# Patient Record
Sex: MALE | Race: WHITE | Employment: OTHER | URBAN - METROPOLITAN AREA
[De-identification: names, ages, dates, MRNs, and addresses within clinical notes are randomized per-mention and may not be internally consistent; named-entity substitution may affect disease eponyms.]

---

## 2022-07-12 ENCOUNTER — OFFICE VISIT (OUTPATIENT)
Dept: PHYSICAL MEDICINE AND REHAB | Age: 72
End: 2022-07-12

## 2022-07-12 VITALS — TEMPERATURE: 97.5 F

## 2022-07-12 DIAGNOSIS — R20.2 PARESTHESIA AND PAIN OF BOTH UPPER EXTREMITIES: ICD-10-CM

## 2022-07-12 DIAGNOSIS — G56.21 ULNAR NEUROPATHY AT WRIST, RIGHT: ICD-10-CM

## 2022-07-12 DIAGNOSIS — M79.601 PARESTHESIA AND PAIN OF BOTH UPPER EXTREMITIES: ICD-10-CM

## 2022-07-12 DIAGNOSIS — G56.03 BILATERAL CARPAL TUNNEL SYNDROME: Primary | ICD-10-CM

## 2022-07-12 DIAGNOSIS — M79.602 PARESTHESIA AND PAIN OF BOTH UPPER EXTREMITIES: ICD-10-CM

## 2022-07-12 PROCEDURE — 95911 NRV CNDJ TEST 9-10 STUDIES: CPT | Performed by: PHYSICAL MEDICINE & REHABILITATION

## 2022-07-12 PROCEDURE — 95886 MUSC TEST DONE W/N TEST COMP: CPT | Performed by: PHYSICAL MEDICINE & REHABILITATION

## 2022-07-12 NOTE — PROGRESS NOTES
EMG REPORT     CHIEF COMPLAINT: Fluctuating numbness, tingling and burning pain in both upper limbs. HISTORY OF PRESENT ILLNESS: 80 y.o. right hand dominant male with a long history of chronic neck pain and stiffness and arm intermittent tingling. In the recent months he has noted more intense burning and stinging in his arms fluctuating from side to side. He does get a shooting pain from his neck into his upper arms at times. He did not describe cramping, rashes or localized swelling. He did rate the pain severity as 7/10. His entire hand can be numb at times. His sleep is often disturbed with the symptoms. He drops things from his . Often the pain is localized in the long fingers. He has no history of diabetes or any thyroid disorder. PHYSICAL EXAMINATION: Alert. About 50 degrees of cervical spine rotation to the right and 40 degrees to the left. Spurling's maneuver was uncomfortable but did not reproduce limb symptoms. Upper limb reflexes were trace and symmetric. Tinel's sign was negative. Thumb opposition MMT was 4+/5 bilaterally. No proximal weakness was identified. Sensation was blunted in all fingertips. There was no atrophy, tremor or clonus present. NERVE CONDUCTION STUDIES:     MOTOR         LATENCY NORMAL AMPLITUDE DISTANCE COND. ROBERTO CARLOS. RIGHT  MEDIAN 5.9 < 4.2 msec 2.3 8 cm 42   LEFT  MEDIAN 5.4 < 4.2 msec 3.3 8 cm 47   RIGHT  ULNAR 3.1 < 4.2 msec 9.2 8 cm 47   LEFT  ULNAR 3.2 < 4.2 msec 8.5 8 cm 49      SENSORY  ORTHODROMIC        LATENCY NORMAL AMPLITUDE DISTANCE   RIGHT MEDIAN Absent <2.3 msec  10 cm   LEFT  MEDIAN Absent < 2.3 msec  10 cm   RIGHT  ULNAR 2.1 < 2.3 msec 5 10 cm   LEFT  ULNAR 2.2 < 2.3 msec 12 10 cm       Right dorsal ulnar sensory: Absent.         NEEDLE EMG:      RIGHT   LEFT     Insertional Activity Spontaneous  Activity Volutional  MUAP's Insertional Activity Spontaneous  Activity Volutional  MUAP's   Cerv Parasp Normal None Normal Normal None Normal Infraspinatus Normal None Normal Normal None Normal   Deltoid   Normal None Normal Normal None Normal   Biceps   Normal None Normal Normal None Normal   Triceps   Normal None Normal Normal None Normal   Pronator Teres Normal None Normal Normal None Normal   Extensor Indicis Normal None Normal Normal None Normal   1st Dorsal Interosseus Normal None Normal Normal None Normal   ADM Normal None Normal Normal None Normal   Abductor Pollicis Br. Normal None Dec #, Larger Normal None Dec #, Larger       FINDINGS:   EMG of the cervical paraspinals and both upper limbs demonstrated no proximal abnormalities. Motor units were diminished in number and larger in the APB muscles of each hand. No limb muscle membrane irritability was identified. The median sensory responses were missing bilaterally. The median motor distal latencies were delayed at each wrist, the evoked amplitudes were reduced in the forearm nerve conduction velocities were slowed. Ulnar nerve conductions were well-maintained, except for the isolated absence of the right dorsal ulnar sensory response. IMPRESSION:      1. Abnormal EMG. 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 2.  Negligible ulnar sensory compromise at the right wrist which is likely a localized posttraumatic issue. 3.  No evidence of an isolated spinal nerve root injury (radiculopathy), plexopathy, generalized neuropathy or primary muscle disease.          Thank you for this interesting referral.

## 2022-07-12 NOTE — LETTER
July 12, 2022          Aureliano Funes MD  [address] [place] [address]           Patient Name: Sailaja Contreras   MRN Number: [id number]   YOB: 1950   Date Of Visit: 07/12/2022            Dear Aureliano Funes MD,      Thank you for referring Sailaja Contreras to me for electrodiagnostic testing. Attached are the findings of the EMG and my assessment. If you have any further questions, please do not hesitate to call me. Thank you for this interesting referral.      Sincerely,          C. Milinda Boxer, MD.